# Patient Record
Sex: FEMALE | URBAN - METROPOLITAN AREA
[De-identification: names, ages, dates, MRNs, and addresses within clinical notes are randomized per-mention and may not be internally consistent; named-entity substitution may affect disease eponyms.]

---

## 2019-07-10 ENCOUNTER — IMPORTED ENCOUNTER (OUTPATIENT)
Dept: URBAN - METROPOLITAN AREA CLINIC 1 | Facility: CLINIC | Age: 24
End: 2019-07-10

## 2019-07-10 PROBLEM — H10.45: Noted: 2019-07-10

## 2019-07-10 PROBLEM — H00.14: Noted: 2019-07-10

## 2019-07-10 PROCEDURE — 92002 INTRM OPH EXAM NEW PATIENT: CPT

## 2019-07-10 NOTE — PATIENT DISCUSSION
1.  Chalazion left upper eyelid -- No relief/improvement with Hot compresses for 6 months. Discussed Incision and Drainage procedure with PMG. Risks and benefits discussed with patient and patient states full understanding. 2. Allergic Conjunctivitis OU -- D/c Sulfacetamide. Begin Pred Forte *Shake Well* BID OU. (Erx) Advised patient to use OTC Zaditor one drop OU BID. Instructed patient to wait 5 minutes in between gtts. Recommend in addition to use the ATs Q1-2 PRN OU. Return for an appointment in I&D with Dr. Ritu Cunningham. Return for an appointment in 2 weeks for a 10 with Dr. Dorothea Mancuso.

## 2019-07-24 ENCOUNTER — IMPORTED ENCOUNTER (OUTPATIENT)
Dept: URBAN - METROPOLITAN AREA CLINIC 1 | Facility: CLINIC | Age: 24
End: 2019-07-24

## 2019-07-24 PROCEDURE — 67800 REMOVE EYELID LESION: CPT

## 2019-07-24 NOTE — PATIENT DISCUSSION
Incision and drainage of chalazion on left upper eyelid: After proper informed consent was obtained the patient was taken to the operating room and placed supine on the operating table. The left upper eye lid was prepped in the standard surgical fashion. Xylocaine 1% and Epinephrine was infiltrated around the chalazion. A chalazion speculum was then placed and the eyelid was everted. A cross incision was made through the chalazion and immediate release of the lipogranulomatous material was expressed. A curette was used to further evacuate the chalazion cavity. The speculum was removed ointment was applied and a pressure patch was then placed. The patient tolerated the procedure well and there were no complications. Maxitrol applied to PRABHA prior to leaving. F/u as scheduled with RBF/PRN.

## 2019-07-30 PROBLEM — H00.14: Noted: 2019-07-30

## 2022-04-02 ASSESSMENT — TONOMETRY
OS_IOP_MMHG: 14
OD_IOP_MMHG: 15

## 2022-04-02 ASSESSMENT — VISUAL ACUITY
OS_CC: 20/20
OD_CC: 20/20
